# Patient Record
Sex: FEMALE | NOT HISPANIC OR LATINO | URBAN - METROPOLITAN AREA
[De-identification: names, ages, dates, MRNs, and addresses within clinical notes are randomized per-mention and may not be internally consistent; named-entity substitution may affect disease eponyms.]

---

## 2017-02-24 ENCOUNTER — FOLLOW UP (OUTPATIENT)
Dept: URBAN - METROPOLITAN AREA CLINIC 46 | Facility: CLINIC | Age: 67
End: 2017-02-24

## 2017-02-24 DIAGNOSIS — H35.81: ICD-10-CM

## 2017-02-24 DIAGNOSIS — H35.363: ICD-10-CM

## 2017-02-24 DIAGNOSIS — H26.492: ICD-10-CM

## 2017-02-24 DIAGNOSIS — H40.62X3: ICD-10-CM

## 2017-02-24 DIAGNOSIS — Z98.890: ICD-10-CM

## 2017-02-24 PROCEDURE — 92226 OPHTHALMOSCOPY (SUB): CPT

## 2017-02-24 PROCEDURE — 92134 CPTRZ OPH DX IMG PST SGM RTA: CPT

## 2017-02-24 PROCEDURE — 1036F TOBACCO NON-USER: CPT

## 2017-02-24 PROCEDURE — G8427 DOCREV CUR MEDS BY ELIG CLIN: HCPCS

## 2017-02-24 PROCEDURE — 92014 COMPRE OPH EXAM EST PT 1/>: CPT

## 2017-02-24 ASSESSMENT — TONOMETRY
OS_IOP_MMHG: 20
OD_IOP_MMHG: 15

## 2017-02-24 ASSESSMENT — VISUAL ACUITY
OS_SC: 20/25
OD_SC: 20/25
OS_CC: 20/25
OD_CC: 20/25

## 2017-07-14 ENCOUNTER — FOLLOW UP (OUTPATIENT)
Dept: URBAN - METROPOLITAN AREA CLINIC 46 | Facility: CLINIC | Age: 67
End: 2017-07-14

## 2017-07-14 DIAGNOSIS — Z98.890: ICD-10-CM

## 2017-07-14 DIAGNOSIS — H35.81: ICD-10-CM

## 2017-07-14 DIAGNOSIS — H35.363: ICD-10-CM

## 2017-07-14 DIAGNOSIS — H26.492: ICD-10-CM

## 2017-07-14 PROCEDURE — 1036F TOBACCO NON-USER: CPT

## 2017-07-14 PROCEDURE — 92134 CPTRZ OPH DX IMG PST SGM RTA: CPT

## 2017-07-14 PROCEDURE — G8482 FLU IMMUNIZE ORDER/ADMIN: HCPCS

## 2017-07-14 PROCEDURE — G8427 DOCREV CUR MEDS BY ELIG CLIN: HCPCS

## 2017-07-14 PROCEDURE — 92226 OPHTHALMOSCOPY (SUB): CPT

## 2017-07-14 PROCEDURE — 92014 COMPRE OPH EXAM EST PT 1/>: CPT

## 2017-07-14 ASSESSMENT — VISUAL ACUITY
OS_CC: 20/25
OS_SC: 20/25-1
OD_CC: 20/25-2
OD_SC: 20/20

## 2017-07-14 ASSESSMENT — TONOMETRY
OD_IOP_MMHG: 18
OS_IOP_MMHG: 18

## 2018-02-02 ENCOUNTER — FOLLOW UP (OUTPATIENT)
Dept: URBAN - METROPOLITAN AREA CLINIC 46 | Facility: CLINIC | Age: 68
End: 2018-02-02

## 2018-02-02 DIAGNOSIS — H35.363: ICD-10-CM

## 2018-02-02 DIAGNOSIS — H35.81: ICD-10-CM

## 2018-02-02 DIAGNOSIS — Z98.890: ICD-10-CM

## 2018-02-02 DIAGNOSIS — H26.492: ICD-10-CM

## 2018-02-02 PROCEDURE — 92134 CPTRZ OPH DX IMG PST SGM RTA: CPT

## 2018-02-02 PROCEDURE — 1036F TOBACCO NON-USER: CPT

## 2018-02-02 PROCEDURE — G8482 FLU IMMUNIZE ORDER/ADMIN: HCPCS

## 2018-02-02 PROCEDURE — 92014 COMPRE OPH EXAM EST PT 1/>: CPT

## 2018-02-02 PROCEDURE — G8427 DOCREV CUR MEDS BY ELIG CLIN: HCPCS

## 2018-02-02 PROCEDURE — 4040F PNEUMOC VAC/ADMIN/RCVD: CPT

## 2018-02-02 PROCEDURE — 92226 OPHTHALMOSCOPY (SUB): CPT

## 2018-02-02 ASSESSMENT — TONOMETRY
OD_IOP_MMHG: 18
OS_IOP_MMHG: 21

## 2018-02-02 ASSESSMENT — VISUAL ACUITY
OD_CC: 20/20-2
OS_CC: 20/30
OD_CC: 20/30-2
OS_CC: 20/20-2

## 2018-08-31 ENCOUNTER — FOLLOW UP (OUTPATIENT)
Dept: URBAN - METROPOLITAN AREA CLINIC 46 | Facility: CLINIC | Age: 68
End: 2018-08-31

## 2018-08-31 DIAGNOSIS — H35.363: ICD-10-CM

## 2018-08-31 DIAGNOSIS — H35.81: ICD-10-CM

## 2018-08-31 DIAGNOSIS — H26.492: ICD-10-CM

## 2018-08-31 DIAGNOSIS — Z98.890: ICD-10-CM

## 2018-08-31 PROCEDURE — 92226 OPHTHALMOSCOPY (SUB): CPT

## 2018-08-31 PROCEDURE — 92014 COMPRE OPH EXAM EST PT 1/>: CPT

## 2018-08-31 PROCEDURE — 4040F PNEUMOC VAC/ADMIN/RCVD: CPT

## 2018-08-31 PROCEDURE — 92134 CPTRZ OPH DX IMG PST SGM RTA: CPT

## 2018-08-31 PROCEDURE — G8482 FLU IMMUNIZE ORDER/ADMIN: HCPCS

## 2018-08-31 PROCEDURE — G9903 PT SCRN TBCO ID AS NON USER: HCPCS

## 2018-08-31 PROCEDURE — 1036F TOBACCO NON-USER: CPT

## 2018-08-31 PROCEDURE — G8427 DOCREV CUR MEDS BY ELIG CLIN: HCPCS

## 2018-08-31 ASSESSMENT — TONOMETRY
OD_IOP_MMHG: 14
OS_IOP_MMHG: 16

## 2018-08-31 ASSESSMENT — VISUAL ACUITY
OS_CC: 20/30-1
OS_SC: 20/25-2
OD_CC: 20/20-2
OD_SC: 20/25

## 2018-12-03 ENCOUNTER — FOLLOW UP (OUTPATIENT)
Dept: URBAN - METROPOLITAN AREA CLINIC 46 | Facility: CLINIC | Age: 68
End: 2018-12-03

## 2018-12-03 DIAGNOSIS — H01.02B: ICD-10-CM

## 2018-12-03 DIAGNOSIS — Z98.890: ICD-10-CM

## 2018-12-03 DIAGNOSIS — H35.81: ICD-10-CM

## 2018-12-03 DIAGNOSIS — H40.013: ICD-10-CM

## 2018-12-03 DIAGNOSIS — H35.363: ICD-10-CM

## 2018-12-03 DIAGNOSIS — H26.492: ICD-10-CM

## 2018-12-03 DIAGNOSIS — H01.02A: ICD-10-CM

## 2018-12-03 DIAGNOSIS — H04.123: ICD-10-CM

## 2018-12-03 DIAGNOSIS — H43.811: ICD-10-CM

## 2018-12-03 PROCEDURE — 92226 OPHTHALMOSCOPY (SUB): CPT

## 2018-12-03 PROCEDURE — 1036F TOBACCO NON-USER: CPT

## 2018-12-03 PROCEDURE — 99213 OFFICE O/P EST LOW 20 MIN: CPT

## 2018-12-03 PROCEDURE — G9903 PT SCRN TBCO ID AS NON USER: HCPCS

## 2018-12-03 PROCEDURE — 4040F PNEUMOC VAC/ADMIN/RCVD: CPT

## 2018-12-03 PROCEDURE — G8427 DOCREV CUR MEDS BY ELIG CLIN: HCPCS

## 2018-12-03 PROCEDURE — G8482 FLU IMMUNIZE ORDER/ADMIN: HCPCS

## 2018-12-03 ASSESSMENT — VISUAL ACUITY
OD_CC: 20/20-2
OS_CC: 20/20-2
OD_CC: 20/100
OS_CC: 20/100

## 2018-12-03 ASSESSMENT — TONOMETRY
OS_IOP_MMHG: 24
OD_IOP_MMHG: 18
OS_IOP_MMHG: 30

## 2019-11-15 ENCOUNTER — FOLLOW UP (OUTPATIENT)
Dept: URBAN - METROPOLITAN AREA CLINIC 46 | Facility: CLINIC | Age: 69
End: 2019-11-15

## 2019-11-15 DIAGNOSIS — H43.811: ICD-10-CM

## 2019-11-15 DIAGNOSIS — H01.02A: ICD-10-CM

## 2019-11-15 DIAGNOSIS — H35.363: ICD-10-CM

## 2019-11-15 DIAGNOSIS — H04.123: ICD-10-CM

## 2019-11-15 DIAGNOSIS — H26.492: ICD-10-CM

## 2019-11-15 DIAGNOSIS — H35.352: ICD-10-CM

## 2019-11-15 DIAGNOSIS — Z98.890: ICD-10-CM

## 2019-11-15 DIAGNOSIS — H01.02B: ICD-10-CM

## 2019-11-15 DIAGNOSIS — H25.11: ICD-10-CM

## 2019-11-15 DIAGNOSIS — H40.013: ICD-10-CM

## 2019-11-15 DIAGNOSIS — Z96.1: ICD-10-CM

## 2019-11-15 PROCEDURE — 92226 OPHTHALMOSCOPY (SUB): CPT

## 2019-11-15 PROCEDURE — 92134 CPTRZ OPH DX IMG PST SGM RTA: CPT

## 2019-11-15 PROCEDURE — 92014 COMPRE OPH EXAM EST PT 1/>: CPT

## 2019-11-15 ASSESSMENT — VISUAL ACUITY
OS_CC: 20/200
OD_CC: 20/25-1
OD_CC: 20/50
OS_CC: 20/40-1

## 2019-11-15 ASSESSMENT — TONOMETRY
OS_IOP_MMHG: 17
OD_IOP_MMHG: 14

## 2019-12-13 ENCOUNTER — FOLLOW UP (OUTPATIENT)
Dept: URBAN - METROPOLITAN AREA CLINIC 46 | Facility: CLINIC | Age: 69
End: 2019-12-13

## 2019-12-13 DIAGNOSIS — H40.013: ICD-10-CM

## 2019-12-13 DIAGNOSIS — H01.02B: ICD-10-CM

## 2019-12-13 DIAGNOSIS — H01.02A: ICD-10-CM

## 2019-12-13 DIAGNOSIS — Z98.890: ICD-10-CM

## 2019-12-13 DIAGNOSIS — H35.352: ICD-10-CM

## 2019-12-13 DIAGNOSIS — H26.492: ICD-10-CM

## 2019-12-13 DIAGNOSIS — H43.811: ICD-10-CM

## 2019-12-13 DIAGNOSIS — Z96.1: ICD-10-CM

## 2019-12-13 DIAGNOSIS — H25.11: ICD-10-CM

## 2019-12-13 DIAGNOSIS — H04.123: ICD-10-CM

## 2019-12-13 DIAGNOSIS — H35.363: ICD-10-CM

## 2019-12-13 PROCEDURE — 92014 COMPRE OPH EXAM EST PT 1/>: CPT

## 2019-12-13 PROCEDURE — 92226 OPHTHALMOSCOPY (SUB): CPT

## 2019-12-13 PROCEDURE — 92134 CPTRZ OPH DX IMG PST SGM RTA: CPT

## 2019-12-13 ASSESSMENT — VISUAL ACUITY
OS_CC: 20/30
OS_SC: 20/30+2
OD_SC: 20/50
OD_CC: 20/25+1

## 2019-12-13 ASSESSMENT — TONOMETRY
OS_IOP_MMHG: 13
OD_IOP_MMHG: 16

## 2020-01-10 ENCOUNTER — FOLLOW UP (OUTPATIENT)
Dept: URBAN - METROPOLITAN AREA CLINIC 46 | Facility: CLINIC | Age: 70
End: 2020-01-10

## 2020-01-10 DIAGNOSIS — H01.02B: ICD-10-CM

## 2020-01-10 DIAGNOSIS — H40.013: ICD-10-CM

## 2020-01-10 DIAGNOSIS — H35.372: ICD-10-CM

## 2020-01-10 DIAGNOSIS — Z98.890: ICD-10-CM

## 2020-01-10 DIAGNOSIS — H43.811: ICD-10-CM

## 2020-01-10 DIAGNOSIS — H35.363: ICD-10-CM

## 2020-01-10 DIAGNOSIS — H01.02A: ICD-10-CM

## 2020-01-10 DIAGNOSIS — H04.123: ICD-10-CM

## 2020-01-10 DIAGNOSIS — H35.352: ICD-10-CM

## 2020-01-10 DIAGNOSIS — H26.492: ICD-10-CM

## 2020-01-10 PROCEDURE — 92134 CPTRZ OPH DX IMG PST SGM RTA: CPT

## 2020-01-10 PROCEDURE — 92202 OPSCPY EXTND ON/MAC DRAW: CPT

## 2020-01-10 PROCEDURE — 92014 COMPRE OPH EXAM EST PT 1/>: CPT

## 2020-01-10 ASSESSMENT — VISUAL ACUITY
OD_SC: 20/30
OS_CC: 20/30
OS_SC: 20/30
OD_CC: 20/20-1

## 2020-01-10 ASSESSMENT — TONOMETRY
OS_IOP_MMHG: 10
OD_IOP_MMHG: 15

## 2020-02-21 ENCOUNTER — FOLLOW UP (OUTPATIENT)
Dept: URBAN - METROPOLITAN AREA CLINIC 46 | Facility: CLINIC | Age: 70
End: 2020-02-21

## 2020-02-21 DIAGNOSIS — H40.013: ICD-10-CM

## 2020-02-21 DIAGNOSIS — H26.492: ICD-10-CM

## 2020-02-21 DIAGNOSIS — H01.02B: ICD-10-CM

## 2020-02-21 DIAGNOSIS — H43.811: ICD-10-CM

## 2020-02-21 DIAGNOSIS — Z98.890: ICD-10-CM

## 2020-02-21 DIAGNOSIS — H35.372: ICD-10-CM

## 2020-02-21 DIAGNOSIS — H04.123: ICD-10-CM

## 2020-02-21 DIAGNOSIS — H01.02A: ICD-10-CM

## 2020-02-21 DIAGNOSIS — H35.352: ICD-10-CM

## 2020-02-21 DIAGNOSIS — H35.363: ICD-10-CM

## 2020-02-21 PROCEDURE — 92014 COMPRE OPH EXAM EST PT 1/>: CPT

## 2020-02-21 PROCEDURE — 92134 CPTRZ OPH DX IMG PST SGM RTA: CPT

## 2020-02-21 PROCEDURE — 92202 OPSCPY EXTND ON/MAC DRAW: CPT

## 2020-02-21 ASSESSMENT — TONOMETRY
OD_IOP_MMHG: 14
OS_IOP_MMHG: 14

## 2020-02-21 ASSESSMENT — VISUAL ACUITY
OD_SC: 20/40
OS_CC: 20/30
OS_SC: 20/30-1
OD_CC: 20/25

## 2020-06-05 ENCOUNTER — FOLLOW UP (OUTPATIENT)
Dept: URBAN - METROPOLITAN AREA CLINIC 46 | Facility: CLINIC | Age: 70
End: 2020-06-05

## 2020-06-05 DIAGNOSIS — H40.013: ICD-10-CM

## 2020-06-05 DIAGNOSIS — H35.363: ICD-10-CM

## 2020-06-05 DIAGNOSIS — H26.492: ICD-10-CM

## 2020-06-05 DIAGNOSIS — H43.811: ICD-10-CM

## 2020-06-05 DIAGNOSIS — H35.372: ICD-10-CM

## 2020-06-05 DIAGNOSIS — H04.123: ICD-10-CM

## 2020-06-05 DIAGNOSIS — H01.02A: ICD-10-CM

## 2020-06-05 DIAGNOSIS — H35.352: ICD-10-CM

## 2020-06-05 DIAGNOSIS — H01.02B: ICD-10-CM

## 2020-06-05 DIAGNOSIS — Z98.890: ICD-10-CM

## 2020-06-05 PROCEDURE — 92134 CPTRZ OPH DX IMG PST SGM RTA: CPT

## 2020-06-05 PROCEDURE — 92202 OPSCPY EXTND ON/MAC DRAW: CPT

## 2020-06-05 PROCEDURE — 92014 COMPRE OPH EXAM EST PT 1/>: CPT

## 2020-06-05 ASSESSMENT — VISUAL ACUITY
OD_CC: 20/40+
OS_CC: 20/50+

## 2020-06-05 ASSESSMENT — TONOMETRY
OD_IOP_MMHG: 18
OS_IOP_MMHG: 21

## 2020-06-30 ENCOUNTER — FOLLOW UP (OUTPATIENT)
Dept: URBAN - METROPOLITAN AREA CLINIC 87 | Facility: CLINIC | Age: 70
End: 2020-06-30

## 2020-06-30 VITALS — HEIGHT: 55 IN

## 2020-06-30 DIAGNOSIS — H35.352: ICD-10-CM

## 2020-06-30 DIAGNOSIS — H01.02B: ICD-10-CM

## 2020-06-30 DIAGNOSIS — H35.372: ICD-10-CM

## 2020-06-30 DIAGNOSIS — H04.123: ICD-10-CM

## 2020-06-30 DIAGNOSIS — H40.013: ICD-10-CM

## 2020-06-30 DIAGNOSIS — H26.492: ICD-10-CM

## 2020-06-30 DIAGNOSIS — H01.02A: ICD-10-CM

## 2020-06-30 DIAGNOSIS — Z98.890: ICD-10-CM

## 2020-06-30 DIAGNOSIS — H43.393: ICD-10-CM

## 2020-06-30 DIAGNOSIS — H43.811: ICD-10-CM

## 2020-06-30 DIAGNOSIS — H35.363: ICD-10-CM

## 2020-06-30 PROCEDURE — 92202 OPSCPY EXTND ON/MAC DRAW: CPT

## 2020-06-30 PROCEDURE — 92134 CPTRZ OPH DX IMG PST SGM RTA: CPT

## 2020-06-30 PROCEDURE — 92014 COMPRE OPH EXAM EST PT 1/>: CPT

## 2020-06-30 ASSESSMENT — TONOMETRY
OS_IOP_MMHG: 14
OD_IOP_MMHG: 15

## 2020-06-30 ASSESSMENT — VISUAL ACUITY
OS_CC: 20/40-1
OD_CC: 20/25

## 2020-07-31 ENCOUNTER — FOLLOW UP (OUTPATIENT)
Dept: URBAN - METROPOLITAN AREA CLINIC 46 | Facility: CLINIC | Age: 70
End: 2020-07-31

## 2020-07-31 VITALS — HEIGHT: 55 IN

## 2020-07-31 DIAGNOSIS — H35.372: ICD-10-CM

## 2020-07-31 DIAGNOSIS — H35.363: ICD-10-CM

## 2020-07-31 DIAGNOSIS — H40.013: ICD-10-CM

## 2020-07-31 DIAGNOSIS — H43.811: ICD-10-CM

## 2020-07-31 DIAGNOSIS — Z98.890: ICD-10-CM

## 2020-07-31 DIAGNOSIS — H01.02A: ICD-10-CM

## 2020-07-31 DIAGNOSIS — H01.02B: ICD-10-CM

## 2020-07-31 DIAGNOSIS — H04.123: ICD-10-CM

## 2020-07-31 DIAGNOSIS — H26.492: ICD-10-CM

## 2020-07-31 DIAGNOSIS — H35.352: ICD-10-CM

## 2020-07-31 PROCEDURE — 92134 CPTRZ OPH DX IMG PST SGM RTA: CPT

## 2020-07-31 PROCEDURE — 92202 OPSCPY EXTND ON/MAC DRAW: CPT

## 2020-07-31 PROCEDURE — 92014 COMPRE OPH EXAM EST PT 1/>: CPT

## 2020-07-31 ASSESSMENT — TONOMETRY
OD_IOP_MMHG: 15
OS_IOP_MMHG: 16

## 2020-07-31 ASSESSMENT — VISUAL ACUITY
OD_CC: 20/20-1
OS_CC: 20/30-1

## 2020-09-11 ENCOUNTER — FOLLOW UP (OUTPATIENT)
Dept: URBAN - METROPOLITAN AREA CLINIC 46 | Facility: CLINIC | Age: 70
End: 2020-09-11

## 2020-09-11 VITALS — HEIGHT: 55 IN

## 2020-09-11 DIAGNOSIS — H01.02B: ICD-10-CM

## 2020-09-11 DIAGNOSIS — H26.492: ICD-10-CM

## 2020-09-11 DIAGNOSIS — Z98.890: ICD-10-CM

## 2020-09-11 DIAGNOSIS — H01.02A: ICD-10-CM

## 2020-09-11 DIAGNOSIS — H43.811: ICD-10-CM

## 2020-09-11 DIAGNOSIS — H35.372: ICD-10-CM

## 2020-09-11 DIAGNOSIS — H04.123: ICD-10-CM

## 2020-09-11 DIAGNOSIS — H40.013: ICD-10-CM

## 2020-09-11 DIAGNOSIS — H35.352: ICD-10-CM

## 2020-09-11 DIAGNOSIS — H35.363: ICD-10-CM

## 2020-09-11 PROCEDURE — 92202 OPSCPY EXTND ON/MAC DRAW: CPT

## 2020-09-11 PROCEDURE — 92014 COMPRE OPH EXAM EST PT 1/>: CPT

## 2020-09-11 PROCEDURE — 92134 CPTRZ OPH DX IMG PST SGM RTA: CPT

## 2020-09-11 ASSESSMENT — TONOMETRY
OS_IOP_MMHG: 13
OD_IOP_MMHG: 14

## 2020-09-11 ASSESSMENT — VISUAL ACUITY
OS_CC: 20/30-2
OD_CC: 20/20

## 2020-10-23 ENCOUNTER — FOLLOW UP (OUTPATIENT)
Dept: URBAN - METROPOLITAN AREA CLINIC 46 | Facility: CLINIC | Age: 70
End: 2020-10-23

## 2020-10-23 DIAGNOSIS — H01.02B: ICD-10-CM

## 2020-10-23 DIAGNOSIS — H35.352: ICD-10-CM

## 2020-10-23 DIAGNOSIS — H04.123: ICD-10-CM

## 2020-10-23 DIAGNOSIS — H43.811: ICD-10-CM

## 2020-10-23 DIAGNOSIS — H01.02A: ICD-10-CM

## 2020-10-23 DIAGNOSIS — Z98.890: ICD-10-CM

## 2020-10-23 DIAGNOSIS — H40.013: ICD-10-CM

## 2020-10-23 DIAGNOSIS — H35.372: ICD-10-CM

## 2020-10-23 DIAGNOSIS — H35.363: ICD-10-CM

## 2020-10-23 DIAGNOSIS — H26.492: ICD-10-CM

## 2020-10-23 PROCEDURE — 92202 OPSCPY EXTND ON/MAC DRAW: CPT

## 2020-10-23 PROCEDURE — 92134 CPTRZ OPH DX IMG PST SGM RTA: CPT

## 2020-10-23 PROCEDURE — 92014 COMPRE OPH EXAM EST PT 1/>: CPT

## 2020-10-23 ASSESSMENT — TONOMETRY
OD_IOP_MMHG: 15
OS_IOP_MMHG: 14

## 2020-10-23 ASSESSMENT — VISUAL ACUITY
OS_CC: 20/30-2
OD_CC: 20/25-2

## 2020-12-11 ENCOUNTER — FOLLOW UP (OUTPATIENT)
Dept: URBAN - METROPOLITAN AREA CLINIC 46 | Facility: CLINIC | Age: 70
End: 2020-12-11

## 2020-12-11 VITALS — HEIGHT: 55 IN

## 2020-12-11 DIAGNOSIS — H35.352: ICD-10-CM

## 2020-12-11 DIAGNOSIS — H35.363: ICD-10-CM

## 2020-12-11 DIAGNOSIS — H40.013: ICD-10-CM

## 2020-12-11 DIAGNOSIS — H26.492: ICD-10-CM

## 2020-12-11 DIAGNOSIS — H01.02B: ICD-10-CM

## 2020-12-11 DIAGNOSIS — H35.372: ICD-10-CM

## 2020-12-11 DIAGNOSIS — H01.02A: ICD-10-CM

## 2020-12-11 DIAGNOSIS — H43.811: ICD-10-CM

## 2020-12-11 DIAGNOSIS — H04.123: ICD-10-CM

## 2020-12-11 DIAGNOSIS — Z98.890: ICD-10-CM

## 2020-12-11 PROCEDURE — 92202 OPSCPY EXTND ON/MAC DRAW: CPT

## 2020-12-11 PROCEDURE — 92014 COMPRE OPH EXAM EST PT 1/>: CPT

## 2020-12-11 PROCEDURE — 92134 CPTRZ OPH DX IMG PST SGM RTA: CPT

## 2020-12-11 ASSESSMENT — TONOMETRY
OS_IOP_MMHG: 14
OD_IOP_MMHG: 14

## 2020-12-11 ASSESSMENT — VISUAL ACUITY
OS_CC: 20/25
OD_CC: 20/25

## 2024-07-13 ENCOUNTER — INPATIENT (INPATIENT)
Facility: HOSPITAL | Age: 74
LOS: 3 days | Discharge: NURSING, REHAB OR LONG-TERM CARE | End: 2024-07-17
Attending: STUDENT IN AN ORGANIZED HEALTH CARE EDUCATION/TRAINING PROGRAM | Admitting: STUDENT IN AN ORGANIZED HEALTH CARE EDUCATION/TRAINING PROGRAM
Payer: MEDICARE

## 2024-07-13 VITALS
TEMPERATURE: 98 F | DIASTOLIC BLOOD PRESSURE: 86 MMHG | HEART RATE: 66 BPM | SYSTOLIC BLOOD PRESSURE: 160 MMHG | OXYGEN SATURATION: 98 % | HEIGHT: 60 IN | WEIGHT: 130.07 LBS | RESPIRATION RATE: 16 BRPM

## 2024-07-13 LAB
ALBUMIN SERPL ELPH-MCNC: 3.8 G/DL — SIGNIFICANT CHANGE UP (ref 3.3–5)
ALP SERPL-CCNC: 73 U/L — SIGNIFICANT CHANGE UP (ref 40–120)
ALT FLD-CCNC: 12 U/L — SIGNIFICANT CHANGE UP (ref 12–78)
ANION GAP SERPL CALC-SCNC: 5 MMOL/L — SIGNIFICANT CHANGE UP (ref 5–17)
APPEARANCE UR: CLEAR — SIGNIFICANT CHANGE UP
APTT BLD: 30.9 SEC — SIGNIFICANT CHANGE UP (ref 24.5–35.6)
AST SERPL-CCNC: 26 U/L — SIGNIFICANT CHANGE UP (ref 15–37)
BASOPHILS # BLD AUTO: 0.04 K/UL — SIGNIFICANT CHANGE UP (ref 0–0.2)
BASOPHILS NFR BLD AUTO: 0.7 % — SIGNIFICANT CHANGE UP (ref 0–2)
BILIRUB SERPL-MCNC: 0.6 MG/DL — SIGNIFICANT CHANGE UP (ref 0.2–1.2)
BILIRUB UR-MCNC: NEGATIVE — SIGNIFICANT CHANGE UP
BUN SERPL-MCNC: 12 MG/DL — SIGNIFICANT CHANGE UP (ref 7–23)
CALCIUM SERPL-MCNC: 9.1 MG/DL — SIGNIFICANT CHANGE UP (ref 8.5–10.1)
CHLORIDE SERPL-SCNC: 113 MMOL/L — HIGH (ref 96–108)
CO2 SERPL-SCNC: 29 MMOL/L — SIGNIFICANT CHANGE UP (ref 22–31)
COLOR SPEC: YELLOW — SIGNIFICANT CHANGE UP
CREAT SERPL-MCNC: 0.78 MG/DL — SIGNIFICANT CHANGE UP (ref 0.5–1.3)
DIFF PNL FLD: NEGATIVE — SIGNIFICANT CHANGE UP
EGFR: 79 ML/MIN/1.73M2 — SIGNIFICANT CHANGE UP
EOSINOPHIL # BLD AUTO: 0.14 K/UL — SIGNIFICANT CHANGE UP (ref 0–0.5)
EOSINOPHIL NFR BLD AUTO: 2.4 % — SIGNIFICANT CHANGE UP (ref 0–6)
GLUCOSE SERPL-MCNC: 100 MG/DL — HIGH (ref 70–99)
GLUCOSE UR QL: NEGATIVE MG/DL — SIGNIFICANT CHANGE UP
HCT VFR BLD CALC: 39.8 % — SIGNIFICANT CHANGE UP (ref 34.5–45)
HGB BLD-MCNC: 13.3 G/DL — SIGNIFICANT CHANGE UP (ref 11.5–15.5)
IMM GRANULOCYTES NFR BLD AUTO: 0.3 % — SIGNIFICANT CHANGE UP (ref 0–0.9)
INR BLD: 0.87 RATIO — SIGNIFICANT CHANGE UP (ref 0.85–1.18)
KETONES UR-MCNC: NEGATIVE MG/DL — SIGNIFICANT CHANGE UP
LEUKOCYTE ESTERASE UR-ACNC: NEGATIVE — SIGNIFICANT CHANGE UP
LYMPHOCYTES # BLD AUTO: 1.24 K/UL — SIGNIFICANT CHANGE UP (ref 1–3.3)
LYMPHOCYTES # BLD AUTO: 21.4 % — SIGNIFICANT CHANGE UP (ref 13–44)
MCHC RBC-ENTMCNC: 30.2 PG — SIGNIFICANT CHANGE UP (ref 27–34)
MCHC RBC-ENTMCNC: 33.4 G/DL — SIGNIFICANT CHANGE UP (ref 32–36)
MCV RBC AUTO: 90.5 FL — SIGNIFICANT CHANGE UP (ref 80–100)
MONOCYTES # BLD AUTO: 0.41 K/UL — SIGNIFICANT CHANGE UP (ref 0–0.9)
MONOCYTES NFR BLD AUTO: 7.1 % — SIGNIFICANT CHANGE UP (ref 2–14)
NEUTROPHILS # BLD AUTO: 3.94 K/UL — SIGNIFICANT CHANGE UP (ref 1.8–7.4)
NEUTROPHILS NFR BLD AUTO: 68.1 % — SIGNIFICANT CHANGE UP (ref 43–77)
NITRITE UR-MCNC: NEGATIVE — SIGNIFICANT CHANGE UP
NRBC # BLD: 0 /100 WBCS — SIGNIFICANT CHANGE UP (ref 0–0)
PH UR: 7.5 — SIGNIFICANT CHANGE UP (ref 5–8)
PLATELET # BLD AUTO: 212 K/UL — SIGNIFICANT CHANGE UP (ref 150–400)
POTASSIUM SERPL-MCNC: 3.5 MMOL/L — SIGNIFICANT CHANGE UP (ref 3.5–5.3)
POTASSIUM SERPL-SCNC: 3.5 MMOL/L — SIGNIFICANT CHANGE UP (ref 3.5–5.3)
PROT SERPL-MCNC: 7.1 GM/DL — SIGNIFICANT CHANGE UP (ref 6–8.3)
PROT UR-MCNC: NEGATIVE MG/DL — SIGNIFICANT CHANGE UP
PROTHROM AB SERPL-ACNC: 10.5 SEC — SIGNIFICANT CHANGE UP (ref 9.5–13)
RBC # BLD: 4.4 M/UL — SIGNIFICANT CHANGE UP (ref 3.8–5.2)
RBC # FLD: 13.9 % — SIGNIFICANT CHANGE UP (ref 10.3–14.5)
SODIUM SERPL-SCNC: 147 MMOL/L — HIGH (ref 135–145)
SP GR SPEC: 1.01 — SIGNIFICANT CHANGE UP (ref 1–1.03)
UROBILINOGEN FLD QL: 0.2 MG/DL — SIGNIFICANT CHANGE UP (ref 0.2–1)
WBC # BLD: 5.79 K/UL — SIGNIFICANT CHANGE UP (ref 3.8–10.5)
WBC # FLD AUTO: 5.79 K/UL — SIGNIFICANT CHANGE UP (ref 3.8–10.5)

## 2024-07-13 PROCEDURE — 72170 X-RAY EXAM OF PELVIS: CPT | Mod: 26,XE

## 2024-07-13 PROCEDURE — 74177 CT ABD & PELVIS W/CONTRAST: CPT | Mod: 26,MC

## 2024-07-13 PROCEDURE — 72125 CT NECK SPINE W/O DYE: CPT | Mod: 26,MC

## 2024-07-13 PROCEDURE — 73502 X-RAY EXAM HIP UNI 2-3 VIEWS: CPT | Mod: 26,RT

## 2024-07-13 PROCEDURE — 93010 ELECTROCARDIOGRAM REPORT: CPT

## 2024-07-13 PROCEDURE — 73562 X-RAY EXAM OF KNEE 3: CPT | Mod: 26,RT

## 2024-07-13 PROCEDURE — 99285 EMERGENCY DEPT VISIT HI MDM: CPT

## 2024-07-13 PROCEDURE — 73552 X-RAY EXAM OF FEMUR 2/>: CPT | Mod: 26,RT

## 2024-07-13 PROCEDURE — 70450 CT HEAD/BRAIN W/O DYE: CPT | Mod: 26,MC

## 2024-07-13 PROCEDURE — 99222 1ST HOSP IP/OBS MODERATE 55: CPT

## 2024-07-13 PROCEDURE — 71260 CT THORAX DX C+: CPT | Mod: 26,MC

## 2024-07-13 RX ORDER — PIMAVANSERIN TARTRATE 10 MG/1
34 TABLET, COATED ORAL
Refills: 0 | Status: DISCONTINUED | OUTPATIENT
Start: 2024-07-13 | End: 2024-07-15

## 2024-07-13 RX ORDER — MEMANTINE HYDROCHLORIDE 7 MG/1
10 CAPSULE, EXTENDED RELEASE ORAL DAILY
Refills: 0 | Status: DISCONTINUED | OUTPATIENT
Start: 2024-07-13 | End: 2024-07-17

## 2024-07-13 RX ORDER — CLONAZEPAM 2 MG/1
0.25 TABLET ORAL AT BEDTIME
Refills: 0 | Status: DISCONTINUED | OUTPATIENT
Start: 2024-07-13 | End: 2024-07-17

## 2024-07-13 RX ORDER — MAGNESIUM, ALUMINUM HYDROXIDE 400-400
30 TABLET,CHEWABLE ORAL EVERY 4 HOURS
Refills: 0 | Status: DISCONTINUED | OUTPATIENT
Start: 2024-07-13 | End: 2024-07-17

## 2024-07-13 RX ORDER — CARBIDOPA AND LEVODOPA 10; 100 MG/1; MG/1
1 TABLET ORAL
Refills: 0 | Status: DISCONTINUED | OUTPATIENT
Start: 2024-07-13 | End: 2024-07-17

## 2024-07-13 RX ORDER — CLONAZEPAM 2 MG/1
0.25 TABLET ORAL AT BEDTIME
Refills: 0 | Status: DISCONTINUED | OUTPATIENT
Start: 2024-07-13 | End: 2024-07-13

## 2024-07-13 RX ORDER — ACETAMINOPHEN 325 MG
1000 TABLET ORAL ONCE
Refills: 0 | Status: COMPLETED | OUTPATIENT
Start: 2024-07-13 | End: 2024-07-13

## 2024-07-13 RX ORDER — ATORVASTATIN CALCIUM 20 MG/1
40 TABLET, FILM COATED ORAL AT BEDTIME
Refills: 0 | Status: DISCONTINUED | OUTPATIENT
Start: 2024-07-13 | End: 2024-07-17

## 2024-07-13 RX ORDER — ASPIRIN 325 MG/1
81 TABLET, FILM COATED ORAL DAILY
Refills: 0 | Status: DISCONTINUED | OUTPATIENT
Start: 2024-07-13 | End: 2024-07-17

## 2024-07-13 RX ORDER — OXYCODONE AND ACETAMINOPHEN 5; 325 MG/1; MG/1
2 TABLET ORAL EVERY 6 HOURS
Refills: 0 | Status: DISCONTINUED | OUTPATIENT
Start: 2024-07-13 | End: 2024-07-17

## 2024-07-13 RX ORDER — CLOPIDOGREL BISULFATE 75 MG/1
75 TABLET, FILM COATED ORAL DAILY
Refills: 0 | Status: DISCONTINUED | OUTPATIENT
Start: 2024-07-13 | End: 2024-07-17

## 2024-07-13 RX ORDER — ACETAMINOPHEN 325 MG
650 TABLET ORAL EVERY 6 HOURS
Refills: 0 | Status: DISCONTINUED | OUTPATIENT
Start: 2024-07-13 | End: 2024-07-17

## 2024-07-13 RX ORDER — ONDANSETRON HYDROCHLORIDE 2 MG/ML
4 INJECTION INTRAMUSCULAR; INTRAVENOUS EVERY 8 HOURS
Refills: 0 | Status: DISCONTINUED | OUTPATIENT
Start: 2024-07-13 | End: 2024-07-17

## 2024-07-13 RX ORDER — AMLODIPINE BESYLATE 2.5 MG/1
2.5 TABLET ORAL DAILY
Refills: 0 | Status: DISCONTINUED | OUTPATIENT
Start: 2024-07-13 | End: 2024-07-17

## 2024-07-13 RX ADMIN — ATORVASTATIN CALCIUM 40 MILLIGRAM(S): 20 TABLET, FILM COATED ORAL at 21:35

## 2024-07-13 RX ADMIN — Medication 1000 MILLIGRAM(S): at 20:57

## 2024-07-13 RX ADMIN — Medication 400 MILLIGRAM(S): at 20:16

## 2024-07-13 RX ADMIN — CLONAZEPAM 0.25 MILLIGRAM(S): 2 TABLET ORAL at 21:36

## 2024-07-13 RX ADMIN — CARBIDOPA AND LEVODOPA 1 TABLET(S): 10; 100 TABLET ORAL at 21:35

## 2024-07-13 NOTE — H&P ADULT - NSHPPHYSICALEXAM_GEN_ALL_CORE
GENERAL: NAD  HEAD:  Atraumatic   EYES: EOMI, slight eyelid droop of L eye (from previous stroke)   ENMT: Moist mucous membranes  NECK: Supple   NERVOUS SYSTEM:  Awake  CHEST/LUNG: CTAB   HEART: RRR   ABDOMEN: Soft, Nontender, Nondistended  EXTREMITIES:   No clubbing, cyanosis, or edema  PSYCH: Mood appropriate

## 2024-07-13 NOTE — PATIENT PROFILE ADULT - FALL HARM RISK - HARM RISK INTERVENTIONS
Assistance with ambulation/Assistance OOB with selected safe patient handling equipment/Communicate Risk of Fall with Harm to all staff/Discuss with provider need for PT consult/Monitor for mental status changes/Monitor gait and stability/Move patient closer to nurses' station/Provide patient with walking aids - walker, cane, crutches/Reinforce activity limits and safety measures with patient and family/Reorient to person, place and time as needed/Tailored Fall Risk Interventions/Toileting schedule using arm’s reach rule for commode and bathroom/Use of alarms - bed, chair and/or voice tab/Visual Cue: Yellow wristband and red socks/Bed in lowest position, wheels locked, appropriate side rails in place/Call bell, personal items and telephone in reach/Instruct patient to call for assistance before getting out of bed or chair/Non-slip footwear when patient is out of bed/[place] to call system/Physically safe environment - no spills, clutter or unnecessary equipment/Purposeful Proactive Rounding/Room/bathroom lighting operational, light cord in reach

## 2024-07-13 NOTE — ED ADULT NURSE REASSESSMENT NOTE - NS ED NURSE REASSESS COMMENT FT1
Pt awake and alert. Sitting up in bed, talking with family member at bedside. Awaiting results. Continuos cardiac monitoring in progress.

## 2024-07-13 NOTE — ED PROVIDER NOTE - MUSCULOSKELETAL, MLM
Spine appears normal and there is no c/t/l spinal tenderness. Pelvis stable. R lateral hip is tender and pain is elicted on R hip movement.

## 2024-07-13 NOTE — CONSULT NOTE ADULT - SUBJECTIVE AND OBJECTIVE BOX
Patient is a 73y Female who presents c/o LBP, R hip pain sp MF earlier today.  Patient was going to use the bathroom when she lost her balance and hit the wall w her R side and slid down.  She was able to walk after. She came to the ED for evaluation. Denies HS/LOC. Denies pain/injury elsewhere. Denies numbness/tingling/paresthesias/weakness. Denies bowel/bladder incontinence. Denies fevers/chills. No other complaints at this time.    HEALTH ISSUES - PROBLEM Dx:          MEDICATIONS  (STANDING):      Allergies    No Known Allergies    Intolerances        PAST MEDICAL & SURGICAL HISTORY:  History of Parkinson's disease    High blood pressure    High cholesterol    Stroke    Osteoporosis                              13.3   5.79  )-----------( 212      ( 2024 08:35 )             39.8       2024 08:35    147    |  113    |  12     ----------------------------<  100    3.5     |  29     |  0.78     Ca    9.1        2024 08:35    TPro  7.1    /  Alb  3.8    /  TBili  0.6    /  DBili  x      /  AST  26     /  ALT  12     /  AlkPhos  73     2024 08:35      PT/INR - ( 2024 08:35 )   PT: 10.5 sec;   INR: 0.87 ratio         PTT - ( 2024 08:35 )  PTT:30.9 sec    Urinalysis Basic - ( 2024 08:35 )    Color: Yellow / Appearance: Clear / S.006 / pH: x  Gluc: 100 mg/dL / Ketone: Negative mg/dL  / Bili: Negative / Urobili: 0.2 mg/dL   Blood: x / Protein: Negative mg/dL / Nitrite: Negative   Leuk Esterase: Negative / RBC: x / WBC x   Sq Epi: x / Non Sq Epi: x / Bacteria: x        Vital Signs Last 24 Hrs  T(C): 36.4 (24 @ 11:07), Max: 36.6 (24 @ 07:46)  T(F): 97.5 (24 @ 11:07), Max: 97.8 (24 @ 07:46)  HR: 61 (24 @ 11:07) (61 - 66)  BP: 137/100 (24 @ 11:07) (137/100 - 160/86)  BP(mean): --  RR: 18 (24 @ 11:07) (16 - 18)  SpO2: 96% (24 @ 11:07) (96% - 98%)    Physical Exam:  Gen: NAD  Spine:  Skin intact  No gross deformity  No midline TTP C/T/L/S spine  No bony step offs  No paraspinal muscle ttp/hypertonicity   Negative Straight leg raise  Negative clonus  Negative babinski  Negative knox  No saddle anesthesia  No pain w axial loading   No pain w log rolling     Motor:                   C5                C6              C7               C8           T1   R            5/5                5/5            5/5             5/5          5/5  L             5/5               5/5             5/5             5/5          5/5                L2             L3             L4               L5            S1  R         5/5           5/5          5/5             5/5           5/5  L          5/5          5/5           5/5             5/5           5/5    Sensory:            C5         C6         C7      C8       T1        (0=absent, 1=impaired, 2=normal, NT=not testable)  R         2            2           2        2         2  L          2            2           2        2         2               L2          L3         L4      L5       S1         (0=absent, 1=impaired, 2=normal, NT=not testable)  R         2            2            2        2        2  L          2            2           2        2         2    Imaging:     < from: CT Chest w/ IV Cont (24 @ 11:44) >    IMPRESSION:  Acute mild L1 compression fracture.    < end of copied text >      A/P: 73y Female with L1 VCF  Pain control  WBAT with assistive devices as needed  FU Labs/imaging  SCDs  FU as outpt w Dr Borja as needed  Will DW Dr Borja and update as needed  stable for ortho DC Patient is a 73y Female who presents c/o LBP, R hip pain sp MF earlier today.  Patient was going to use the bathroom when she lost her balance and hit the wall w her R side and slid down.  She was able to walk after. She came to the ED for evaluation. Denies HS/LOC. Denies pain/injury elsewhere. Denies numbness/tingling/paresthesias/weakness. Denies bowel/bladder incontinence. Denies fevers/chills. No other complaints at this time.    HEALTH ISSUES - PROBLEM Dx:          MEDICATIONS  (STANDING):      Allergies    No Known Allergies    Intolerances        PAST MEDICAL & SURGICAL HISTORY:  History of Parkinson's disease    High blood pressure    High cholesterol    Stroke    Osteoporosis                              13.3   5.79  )-----------( 212      ( 2024 08:35 )             39.8       2024 08:35    147    |  113    |  12     ----------------------------<  100    3.5     |  29     |  0.78     Ca    9.1        2024 08:35    TPro  7.1    /  Alb  3.8    /  TBili  0.6    /  DBili  x      /  AST  26     /  ALT  12     /  AlkPhos  73     2024 08:35      PT/INR - ( 2024 08:35 )   PT: 10.5 sec;   INR: 0.87 ratio         PTT - ( 2024 08:35 )  PTT:30.9 sec    Urinalysis Basic - ( 2024 08:35 )    Color: Yellow / Appearance: Clear / S.006 / pH: x  Gluc: 100 mg/dL / Ketone: Negative mg/dL  / Bili: Negative / Urobili: 0.2 mg/dL   Blood: x / Protein: Negative mg/dL / Nitrite: Negative   Leuk Esterase: Negative / RBC: x / WBC x   Sq Epi: x / Non Sq Epi: x / Bacteria: x        Vital Signs Last 24 Hrs  T(C): 36.4 (24 @ 11:07), Max: 36.6 (24 @ 07:46)  T(F): 97.5 (24 @ 11:07), Max: 97.8 (24 @ 07:46)  HR: 61 (24 @ 11:07) (61 - 66)  BP: 137/100 (24 @ 11:07) (137/100 - 160/86)  BP(mean): --  RR: 18 (24 @ 11:07) (16 - 18)  SpO2: 96% (24 @ 11:07) (96% - 98%)    Physical Exam:  Gen: NAD  Spine:  Skin intact  No gross deformity  No midline TTP C/T/L/S spine  No bony step offs  No paraspinal muscle ttp/hypertonicity   Negative Straight leg raise  Negative clonus  Negative babinski  Negative knox  No saddle anesthesia  No pain w axial loading   No pain w log rolling     Motor:                   C5                C6              C7               C8           T1   R            5/5                5/5            5/5             5/5          5/5  L             5/5               5/5             5/5             5/5          5/5                L2             L3             L4               L5            S1  R         5/5           5/5          5/5             5/5           5/5  L          5/5          5/5           5/5             5/5           5/5    Sensory:            C5         C6         C7      C8       T1        (0=absent, 1=impaired, 2=normal, NT=not testable)  R         2            2           2        2         2  L          2            2           2        2         2               L2          L3         L4      L5       S1         (0=absent, 1=impaired, 2=normal, NT=not testable)  R         2            2            2        2        2  L          2            2           2        2         2    Imaging:     < from: CT Chest w/ IV Cont (24 @ 11:44) >    IMPRESSION:  Acute mild L1 compression fracture.    < end of copied text >      A/P: 73y Female with L1 VCF  Pain control  WBAT with assistive devices as needed  FU Labs/imaging  SCDs  FU as outpt w Dr Alcaraz as needed  Will DW Dr Borja and update as needed  stable for ortho DC

## 2024-07-13 NOTE — ED ADULT NURSE NOTE - NSFALLHARMRISKINTERV_ED_ALL_ED
Assistance OOB with selected safe patient handling equipment if applicable/Assistance with ambulation/Communicate risk of Fall with Harm to all staff, patient, and family/Monitor gait and stability/Provide visual cue: red socks, yellow wristband, yellow gown, etc/Reinforce activity limits and safety measures with patient and family/Bed in lowest position, wheels locked, appropriate side rails in place/Call bell, personal items and telephone in reach/Instruct patient to call for assistance before getting out of bed/chair/stretcher/Non-slip footwear applied when patient is off stretcher/[place] to call system/Physically safe environment - no spills, clutter or unnecessary equipment/Purposeful Proactive Rounding/Room/bathroom lighting operational, light cord in reach

## 2024-07-13 NOTE — ED PROVIDER NOTE - PROGRESS NOTE DETAILS
appreciate ortho consult who states pt doesn't need surgery. however the patient and her sister are concerned its unsafe for her to be home in this condition bc of the stairs at home and the patient is still having trouble walking due to pain so will adm for pt mily, pain control - Elmer PINON

## 2024-07-13 NOTE — H&P ADULT - HISTORY OF PRESENT ILLNESS
This is a 74 yo F PMH Parkinson's disease, CVA, osteoporosis, HTN, HLD presenting after fall. States she was walking back from the bathroom and lost balance. She is accompanied by her sister at bedside. She denies hitting her head or any loss of consciousness. She hit her hip and back when she fell. She is supposed to use a walker but does not. Any movement exacerbates the pain, while staying still alleviates it. She is unable to ambulate independently due to pain. There are no other associated symptoms. Denies numbness, tingling in lower extremities. Denies fever, chills, nausea, vomiting, lightheadedness, dizziness, changes in vision or hearing.     In ED, VSS. Labs revealed mildly elevated Na at 147. UA negative. CT chest w/ IV cont revealed L1 compression fracture. Ortho evaluated the patient, no acute intervention. Admit for PT.

## 2024-07-13 NOTE — PATIENT PROFILE ADULT - FALL HARM RISK - FACTORS
Poor balance/Other Tumor Depth: Less than 6mm from granular layer and no invasion beyond the subcutaneous fat

## 2024-07-13 NOTE — PATIENT PROFILE ADULT - FUNCTIONAL ASSESSMENT - BASIC MOBILITY 6.
3-calculated by average/Not able to assess (calculate score using [facility] averaging method)

## 2024-07-13 NOTE — ED ADULT NURSE NOTE - OBJECTIVE STATEMENT
73 yr old female with PMH of Parkinson's disease, early onset dementia, hypertension. Pt comes in after falling this morning from losing balance. Pt sister was in the room with her but unsure if pt hit head. Pt states falling on a tile matt. Pt denies chest pain, dizziness.

## 2024-07-13 NOTE — ED PROVIDER NOTE - OBJECTIVE STATEMENT
At about 5am today the patient was walking to the bathroom to urinate when she lost her balance and fell striking the wall with her right shoulder and hip then she went to the ground. No loss of consciousness. Her sister helped her to the bathroom where she urinated and gave the patient 500mg of acetaminophen, which helped with the pain, then EMS was called. The patient reports EMS placed an IV and gave IV pain medicine.

## 2024-07-13 NOTE — ED PROVIDER NOTE - CARDIAC, MLM
Normal rate, regular rhythm.  Heart sounds S1, S2.  No murmurs, rubs or gallops. DP pulses 2+ and equal bilaterally.

## 2024-07-13 NOTE — PATIENT PROFILE ADULT - HISTORY OF COVID-19 VACCINATION
[FreeTextEntry1] : 65-year-old man returns for rheumatology evaluation.  Patient previously with history of PMR in 2018, treated with prednisone at that time for about 8 to 9 months, completed prednisone in 2019.  Patient reports onset of polyarthralgias about 2 to 3 months ago, worse in the morning, not associated with swelling of the joints, most predominant in the proximal distribution of the shoulders and hips, although also including the hands and wrist bilaterally.  Patient reports symptoms similar to previous history of PMR although not as severe, however with associated fatigue at this time as well.  At this time, possible recurrence of PMR versus an inflammatory polyarthritis given involvement of the hands and wrists, versus early myelopathic symptoms.  Will update labs today in office including CBC, CMP, ESR, CRP, CHIQUIS with reflex to serologies, rheumatoid factor, CCP, CPK, aldolase, as well as Lyme serologies.  Probable initiation of prednisone following lab results.  Further management pending results.  
Yes

## 2024-07-13 NOTE — H&P ADULT - ASSESSMENT
This is a 72 yo F PMH Parkinson's disease, CVA, osteoporosis, HTN, HLD presenting after fall.    Fall       Parkinson's Disease     H/o CVA     HTN    HLD     Osteoporosis    DVT ppx    This is a 74 yo F PMH Parkinson's disease, CVA, osteoporosis, HTN, HLD presenting after fall.    Fall   L1 compression fracture   CT chest, abdomen, pelvis revealing L1 compression fracture   XRays as above  Evaluated by ortho- no acute intervention  WBAT  Pain control   Unable to ambulate d/t pain -- PT consult     Parkinson's Disease   C/w home carbidopa/levadopa 25/100mg TID and Carbidopa/levadopa ER 25/100mg QHS     H/o CVA   C/w aspirin and statin     HTN  C/w amlodipine 2.5mg qd  DASH diet     HLD   c/w statin     Thyroid enlargement  Thyroid nodules   Will check TSH level   Recommend nonemergent thyroid function testing and ultrasound evaluation outpatient    Osteoporosis  Used to be on monthly therapy, no longer taking   Would likely benefit from bisphosphonate therapy given compression fracture   F/u pcp    DVT ppx - scds

## 2024-07-13 NOTE — ED ADULT TRIAGE NOTE - CHIEF COMPLAINT QUOTE
s/p fall in the house c/o pain to the lower back and side, patient is on Plavix . h/o parkinson's and high blood pressure. med lock 20 gauge left ac and Toradol 30mg IV given

## 2024-07-13 NOTE — ED ADULT NURSE NOTE - CAS TRG GENERAL AIRWAY, MLM
Patient awake and alert, rr unlabored, skin warm and dry. VSS. Discharge instructions provided and explained to parent. Tylenol/motrin usage explained - went over frequency and dosing. Parent verbalized understanding of all instructions. Parent aware to follow up with PCP as instructed.  Aware to come back to ED if any worsening symptoms. All questions were answered. Patient discharged home in stable condition. Instructed how to obtain results via [employer] drew if they choose.   
Patent

## 2024-07-13 NOTE — H&P ADULT - NSHPLABSRESULTS_GEN_ALL_CORE
13.3   5.79  )-----------( 212      ( 2024 08:35 )             39.8     07-13    147<H>  |  113<H>  |  12  ----------------------------<  100<H>  3.5   |  29  |  0.78    Ca    9.1      2024 08:35    TPro  7.1  /  Alb  3.8  /  TBili  0.6  /  DBili  x   /  AST  26  /  ALT  12  /  AlkPhos  73  07-13    PT/INR - ( 2024 08:35 )   PT: 10.5 sec;   INR: 0.87 ratio         PTT - ( 2024 08:35 )  PTT:30.9 sec  Urinalysis Basic - ( 2024 08:35 )    Color: Yellow / Appearance: Clear / S.006 / pH: x  Gluc: 100 mg/dL / Ketone: Negative mg/dL  / Bili: Negative / Urobili: 0.2 mg/dL   Blood: x / Protein: Negative mg/dL / Nitrite: Negative   Leuk Esterase: Negative / RBC: x / WBC x   Sq Epi: x / Non Sq Epi: x / Bacteria: x      < from: CT Abdomen and Pelvis w/ IV Cont (24 @ 11:45) >    PROCEDURE:  CT of the Chest, Abdomen and Pelvis was performed.  Sagittal and coronal reformats were performed.    FINDINGS:  CHEST:  LUNGS AND LARGE AIRWAYS: Patent central airways. No pulmonary nodules.   Scattered areas of minor linear scarring. No pulmonic contusion. No   airspace consolidation.  PLEURA: No pleural effusion.  VESSELS: Within normal limits.  HEART: Cardiomegaly. No pericardial effusion.  MEDIASTINUM AND JOSEPH: No lymphadenopathy.  CHEST WALL AND LOWER NECK: Nodular enlargement of the thyroid gland.   Scattered 5 mm and less low attenuation thyroid nodules.    ABDOMEN AND PELVIS:  LIVER: 1.1cm cyst or hemangioma inferior aspect right hepatic lobe. No   perihepatic fluid/capsular contusion.  BILE DUCTS: Normal caliber.  GALLBLADDER: Within normal limits.  SPLEEN: Within normal limits.  PANCREAS: Within normal limits.  ADRENALS: Within normal limits.  KIDNEYS/URETERS: Symmetric renal enhancement. No collecting system   obstruction bilaterally.    BLADDER: Within normal limits.  REPRODUCTIVE ORGANS: Within normal limits for patient of this age    BOWEL: No bowel obstruction. Appendix is normal.  PERITONEUM/RETROPERITONEUM: Within normal limits.  VESSELS: Atherosclerotic calcifications  LYMPH NODES: No lymphadenopathy.  ABDOMINAL WALL: Within normal limits.  BONES: No acute osseous injuries. Degenerative changes    IMPRESSION:  No acute findings in the chest, abdomen and pelvis.    < end of copied text >    < from: Xray Hip 2-3 Views, Right (24 @ 09:48) >      INTERPRETATION:  Right hip, pelvis, right femur, and right knee. Patient   had a fall.    IMPRESSION: Right knee replacement. No acute finding.    < end of copied text >  < from: CT Head No Cont (24 @ 11:42) >    IMPRESSION:  CT head:  -No acute intracranial findings.    CT cervical spine:  -No acute fracture or dislocation.  -Mildly enlarged heterogenous thyroid. Consider nonemergent thyroid   function testing and ultrasound evaluation.    < end of copied text >

## 2024-07-13 NOTE — ED PROVIDER NOTE - NSICDXPASTMEDICALHX_GEN_ALL_CORE_FT
PAST MEDICAL HISTORY:  High blood pressure     High cholesterol     History of Parkinson's disease     Osteoporosis     Stroke

## 2024-07-14 LAB
ANION GAP SERPL CALC-SCNC: 8 MMOL/L — SIGNIFICANT CHANGE UP (ref 5–17)
BUN SERPL-MCNC: 18 MG/DL — SIGNIFICANT CHANGE UP (ref 7–23)
CALCIUM SERPL-MCNC: 9.3 MG/DL — SIGNIFICANT CHANGE UP (ref 8.5–10.1)
CHLORIDE SERPL-SCNC: 105 MMOL/L — SIGNIFICANT CHANGE UP (ref 96–108)
CO2 SERPL-SCNC: 26 MMOL/L — SIGNIFICANT CHANGE UP (ref 22–31)
CREAT SERPL-MCNC: 0.68 MG/DL — SIGNIFICANT CHANGE UP (ref 0.5–1.3)
EGFR: 91 ML/MIN/1.73M2 — SIGNIFICANT CHANGE UP
GLUCOSE SERPL-MCNC: 86 MG/DL — SIGNIFICANT CHANGE UP (ref 70–99)
HCT VFR BLD CALC: 41.3 % — SIGNIFICANT CHANGE UP (ref 34.5–45)
HGB BLD-MCNC: 13.7 G/DL — SIGNIFICANT CHANGE UP (ref 11.5–15.5)
MCHC RBC-ENTMCNC: 29.8 PG — SIGNIFICANT CHANGE UP (ref 27–34)
MCHC RBC-ENTMCNC: 33.2 G/DL — SIGNIFICANT CHANGE UP (ref 32–36)
MCV RBC AUTO: 90 FL — SIGNIFICANT CHANGE UP (ref 80–100)
NRBC # BLD: 0 /100 WBCS — SIGNIFICANT CHANGE UP (ref 0–0)
PLATELET # BLD AUTO: 203 K/UL — SIGNIFICANT CHANGE UP (ref 150–400)
POTASSIUM SERPL-MCNC: 3.5 MMOL/L — SIGNIFICANT CHANGE UP (ref 3.5–5.3)
POTASSIUM SERPL-SCNC: 3.5 MMOL/L — SIGNIFICANT CHANGE UP (ref 3.5–5.3)
RBC # BLD: 4.59 M/UL — SIGNIFICANT CHANGE UP (ref 3.8–5.2)
RBC # FLD: 13.7 % — SIGNIFICANT CHANGE UP (ref 10.3–14.5)
SODIUM SERPL-SCNC: 139 MMOL/L — SIGNIFICANT CHANGE UP (ref 135–145)
TSH SERPL-MCNC: 1.06 UIU/ML — SIGNIFICANT CHANGE UP (ref 0.36–3.74)
WBC # BLD: 4.3 K/UL — SIGNIFICANT CHANGE UP (ref 3.8–10.5)
WBC # FLD AUTO: 4.3 K/UL — SIGNIFICANT CHANGE UP (ref 3.8–10.5)

## 2024-07-14 PROCEDURE — 99232 SBSQ HOSP IP/OBS MODERATE 35: CPT

## 2024-07-14 PROCEDURE — 93010 ELECTROCARDIOGRAM REPORT: CPT

## 2024-07-14 RX ADMIN — MEMANTINE HYDROCHLORIDE 10 MILLIGRAM(S): 7 CAPSULE, EXTENDED RELEASE ORAL at 11:38

## 2024-07-14 RX ADMIN — AMLODIPINE BESYLATE 2.5 MILLIGRAM(S): 2.5 TABLET ORAL at 05:17

## 2024-07-14 RX ADMIN — Medication 650 MILLIGRAM(S): at 16:30

## 2024-07-14 RX ADMIN — Medication 650 MILLIGRAM(S): at 06:18

## 2024-07-14 RX ADMIN — Medication 650 MILLIGRAM(S): at 15:58

## 2024-07-14 RX ADMIN — CARBIDOPA AND LEVODOPA 1 TABLET(S): 10; 100 TABLET ORAL at 16:17

## 2024-07-14 RX ADMIN — CLONAZEPAM 0.25 MILLIGRAM(S): 2 TABLET ORAL at 21:28

## 2024-07-14 RX ADMIN — CARBIDOPA AND LEVODOPA 1 TABLET(S): 10; 100 TABLET ORAL at 09:18

## 2024-07-14 RX ADMIN — ATORVASTATIN CALCIUM 40 MILLIGRAM(S): 20 TABLET, FILM COATED ORAL at 21:28

## 2024-07-14 RX ADMIN — Medication 650 MILLIGRAM(S): at 05:18

## 2024-07-14 RX ADMIN — CARBIDOPA AND LEVODOPA 1 TABLET(S): 10; 100 TABLET ORAL at 11:38

## 2024-07-14 RX ADMIN — CLOPIDOGREL BISULFATE 75 MILLIGRAM(S): 75 TABLET, FILM COATED ORAL at 11:38

## 2024-07-14 RX ADMIN — CARBIDOPA AND LEVODOPA 1 TABLET(S): 10; 100 TABLET ORAL at 19:53

## 2024-07-14 RX ADMIN — ASPIRIN 81 MILLIGRAM(S): 325 TABLET, FILM COATED ORAL at 11:37

## 2024-07-14 NOTE — PHYSICAL THERAPY INITIAL EVALUATION ADULT - ADDITIONAL COMMENTS
as per patient and verified by sister, pt lives in a rental apartment with 4 stairs with R HR to access to main level. they do not have stairs inside the house, pt was independent with no AD.

## 2024-07-14 NOTE — PHYSICAL THERAPY INITIAL EVALUATION ADULT - GAIT TRAINING, PT EVAL
To be able to perform ambulation independently using rolling walker for 200 feet using proper technique using AD, with proper posture and functional distance at home in 2 weeks.

## 2024-07-14 NOTE — PHYSICAL THERAPY INITIAL EVALUATION ADULT - PERTINENT HX OF CURRENT PROBLEM, REHAB EVAL
This is the hx of ANABELA a 72 y/o female patient who was admitted to [facility] due to complications of Compression Fracture L1 affecting medical condition and with subsequent affection on functional mobility. CT chest 7/13/24: Acute mild L1 compression Fx.

## 2024-07-15 PROCEDURE — 99239 HOSP IP/OBS DSCHRG MGMT >30: CPT

## 2024-07-15 RX ORDER — PIMAVANSERIN TARTRATE 10 MG/1
1 TABLET, COATED ORAL
Qty: 0 | Refills: 0 | DISCHARGE
Start: 2024-07-15

## 2024-07-15 RX ORDER — AMLODIPINE BESYLATE 2.5 MG/1
1 TABLET ORAL
Qty: 0 | Refills: 0 | DISCHARGE
Start: 2024-07-15

## 2024-07-15 RX ORDER — ATORVASTATIN CALCIUM 20 MG/1
1 TABLET, FILM COATED ORAL
Qty: 0 | Refills: 0 | DISCHARGE
Start: 2024-07-15

## 2024-07-15 RX ORDER — MEMANTINE HYDROCHLORIDE 7 MG/1
1 CAPSULE, EXTENDED RELEASE ORAL
Qty: 0 | Refills: 0 | DISCHARGE
Start: 2024-07-15

## 2024-07-15 RX ORDER — ASPIRIN 325 MG/1
1 TABLET, FILM COATED ORAL
Qty: 0 | Refills: 0 | DISCHARGE
Start: 2024-07-15

## 2024-07-15 RX ORDER — CLOPIDOGREL BISULFATE 75 MG/1
1 TABLET, FILM COATED ORAL
Qty: 0 | Refills: 0 | DISCHARGE
Start: 2024-07-15

## 2024-07-15 RX ADMIN — CLONAZEPAM 0.25 MILLIGRAM(S): 2 TABLET ORAL at 21:23

## 2024-07-15 RX ADMIN — CARBIDOPA AND LEVODOPA 1 TABLET(S): 10; 100 TABLET ORAL at 11:00

## 2024-07-15 RX ADMIN — ASPIRIN 81 MILLIGRAM(S): 325 TABLET, FILM COATED ORAL at 11:00

## 2024-07-15 RX ADMIN — MEMANTINE HYDROCHLORIDE 10 MILLIGRAM(S): 7 CAPSULE, EXTENDED RELEASE ORAL at 11:00

## 2024-07-15 RX ADMIN — CARBIDOPA AND LEVODOPA 1 TABLET(S): 10; 100 TABLET ORAL at 08:02

## 2024-07-15 RX ADMIN — ATORVASTATIN CALCIUM 40 MILLIGRAM(S): 20 TABLET, FILM COATED ORAL at 21:22

## 2024-07-15 RX ADMIN — CLOPIDOGREL BISULFATE 75 MILLIGRAM(S): 75 TABLET, FILM COATED ORAL at 11:00

## 2024-07-15 RX ADMIN — CARBIDOPA AND LEVODOPA 1 TABLET(S): 10; 100 TABLET ORAL at 20:10

## 2024-07-15 RX ADMIN — CARBIDOPA AND LEVODOPA 1 TABLET(S): 10; 100 TABLET ORAL at 15:56

## 2024-07-15 NOTE — DISCHARGE NOTE PROVIDER - NSDCCPCAREPLAN_GEN_ALL_CORE_FT
PRINCIPAL DISCHARGE DIAGNOSIS  Diagnosis: Compression fracture of L1 vertebra  Assessment and Plan of Treatment: Pain control and home physical therapy.   Please follow up with your PCP, you may benefit from bisphosphonate therapy.     PRINCIPAL DISCHARGE DIAGNOSIS  Diagnosis: Compression fracture of L1 vertebra  Assessment and Plan of Treatment: Pain control and home physical therapy.   Please follow up with your PCP, you may benefit from bisphosphonate therapy.      SECONDARY DISCHARGE DIAGNOSES  Diagnosis: Thyroid enlargement  Assessment and Plan of Treatment: F/u PCP for thyroid ultrasound as needed.

## 2024-07-15 NOTE — DISCHARGE NOTE PROVIDER - HOSPITAL COURSE
This is a 72 yo F PMH Parkinson's disease, CVA, osteoporosis, HTN, HLD presenting after fall.    Fall   L1 compression fracture   CT chest, abdomen, pelvis revealing L1 compression fracture   XRays as above  Evaluated by ortho- no acute intervention  WBAT  Pain control   Unable to ambulate d/t pain -- PT consult-- recommended home pt     Parkinson's Disease   C/w home carbidopa/levadopa 25/100mg TID and Carbidopa/levadopa ER 25/100mg QHS     H/o CVA   C/w aspirin and statin     HTN  C/w amlodipine 2.5mg qd  DASH diet     HLD   c/w statin     Thyroid enlargement  Thyroid nodules   TSH level WNL  Recommend nonemergent thyroid function testing and ultrasound evaluation outpatient    Osteoporosis  Used to be on monthly therapy, no longer taking   Would likely benefit from bisphosphonate therapy given compression fracture   F/u pcp    DVT ppx - scds    Patient seen and evaluated. DC home with home PT.     DC time: 37 mins This is a 74 yo F PMH Parkinson's disease, CVA, osteoporosis, HTN, HLD presenting after fall. Noted with L1 compression fracture. Evaluated by ortho- no acute intervention, WBAT  Pain controlled, and eval by PT, plan for dispo to [place].   Also noted with thyroid enlargement, f/u PCP for thyroid ultrasound as needed.

## 2024-07-15 NOTE — DISCHARGE NOTE NURSING/CASE MANAGEMENT/SOCIAL WORK - PATIENT PORTAL LINK FT
You can access the FollowMyHealth Patient Portal offered by [facility] by registering at the following website: [email or web address]. By joining "[employer]"’s FollowMyHealth portal, you will also be able to view your health information using other applications (apps) compatible with our system.

## 2024-07-15 NOTE — DISCHARGE NOTE PROVIDER - CARE PROVIDER_API CALL
Your PCP,   Phone: (   )    -  Fax: (   )    -  Follow Up Time:     Your neurologist,   Phone: (   )    -  Fax: (   )    -  Follow Up Time:     Dejuan Alcaraz  Orthopaedic Surgery  [address]  [place] [address]  Phone: [phone]  Fax: [phone]  Follow Up Time: 1 week

## 2024-07-15 NOTE — DISCHARGE NOTE PROVIDER - NSDCMRMEDTOKEN_GEN_ALL_CORE_FT
amLODIPine 2.5 mg oral tablet: 1 tab(s) orally once a day  aspirin 81 mg oral tablet, chewable: 1 tab(s) orally once a day  atorvastatin 40 mg oral tablet: 1 tab(s) orally once a day (at bedtime)  clopidogrel 75 mg oral tablet: 1 tab(s) orally once a day  memantine 10 mg oral tablet: 1 tab(s) orally once a day  pimavanserin 34 mg oral capsule: 1 cap(s) orally once a day   amLODIPine 2.5 mg oral tablet: 1 tab(s) orally once a day  aspirin 81 mg oral tablet, chewable: 1 tab(s) orally once a day  atorvastatin 40 mg oral tablet: 1 tab(s) orally once a day (at bedtime)  carbidopa-levodopa 25 mg-100 mg oral tablet: 1 tab(s) orally 3 times a day  carbidopa-levodopa 25 mg-100 mg oral tablet, extended release: 1 tab(s) orally once a day (at bedtime)  clopidogrel 75 mg oral tablet: 1 tab(s) orally once a day  memantine 10 mg oral tablet: 1 tab(s) orally once a day  oxycodone-acetaminophen 5 mg-325 mg oral tablet: 1 tab(s) orally every 6 hours As needed Moderate Pain (4 - 6)  oxycodone-acetaminophen 5 mg-325 mg oral tablet: 2 tab(s) orally every 6 hours As needed Severe Pain (7 - 10)  pimavanserin 34 mg oral capsule: 1 cap(s) orally once a day

## 2024-07-15 NOTE — DISCHARGE NOTE PROVIDER - PROVIDER RX CONTACT NUMBER
[phone] Bilobed Flap Text: The defect edges were debeveled with a #15c scalpel blade.  Given the location of the defect and the proximity to free margins a bilobe flap was deemed most appropriate.  Using a sterile surgical marker, an appropriate bilobe flap drawn around the defect.    The area thus outlined was incised deep to adipose tissue with a #15 scalpel blade.  The skin margins were undermined to an appropriate distance in all directions utilizing iris scissors.

## 2024-07-15 NOTE — DISCHARGE NOTE PROVIDER - ATTENDING DISCHARGE PHYSICAL EXAMINATION:
VITALS:   T(C): 36.3 (07-17-24 @ 11:14), Max: 36.9 (07-16-24 @ 16:40)  HR: 62 (07-17-24 @ 11:14) (57 - 63)  BP: 116/75 (07-17-24 @ 11:14) (102/62 - 146/82)  RR: 18 (07-17-24 @ 11:14) (17 - 18)  SpO2: 96% (07-17-24 @ 11:14) (93% - 100%)    GENERAL: NAD, lying in bed comfortably  HEAD:  Atraumatic, Normocephalic  CHEST/LUNG: Clear to auscultation bilaterally; Unlabored respirations  HEART: Regular rate and rhythm; No murmurs, rubs, or gallops  ABDOMEN: BSx4; Soft, nontender, nondistended  SKIN: No rashes or lesions

## 2024-07-15 NOTE — CHART NOTE - NSCHARTNOTEFT_GEN_A_CORE
Pt needs rolling walker for MR ADLs in home due to fall and compression fracture of vertebrae. Pt needs walker to perform mR aDLs.
Chart reviewed and events to date noted. Upon request of  [facility]--discussed c patient and sister by bedside current progress with mobility, falls risk and near-baseline functioning. Patient s/p fall at home. Advised of what home PT will provide (home safety assessment, potential for temporary home attendant and visiting RN). Both patient and family still prefers to apply for Short Term Rehab, as sister stated she cannot 'lift' patient or provide physical support to her. They are aware of no assurance for Short Term Rehab approval.  advised of their preference. PT will continue to progress patient and recommend safe discharge plan.

## 2024-07-15 NOTE — DISCHARGE NOTE PROVIDER - PROVIDER TOKENS
FREE:[LAST:[Your PCP],PHONE:[(   )    -],FAX:[(   )    -]],FREE:[LAST:[Your neurologist],PHONE:[(   )    -],FAX:[(   )    -]],PROVIDER:[TOKEN:[[id number]],FOLLOWUP:[1 week]]

## 2024-07-15 NOTE — DISCHARGE NOTE NURSING/CASE MANAGEMENT/SOCIAL WORK - NSDCPEFALRISK_GEN_ALL_CORE
For information on Fall & Injury Prevention, visit: [email or web address] OR  [email or web address] OR  https://www.cdc.gov/steadi/patient.html

## 2024-07-16 PROCEDURE — 99232 SBSQ HOSP IP/OBS MODERATE 35: CPT

## 2024-07-16 RX ADMIN — MEMANTINE HYDROCHLORIDE 10 MILLIGRAM(S): 7 CAPSULE, EXTENDED RELEASE ORAL at 11:15

## 2024-07-16 RX ADMIN — CARBIDOPA AND LEVODOPA 1 TABLET(S): 10; 100 TABLET ORAL at 17:10

## 2024-07-16 RX ADMIN — CLOPIDOGREL BISULFATE 75 MILLIGRAM(S): 75 TABLET, FILM COATED ORAL at 11:15

## 2024-07-16 RX ADMIN — ATORVASTATIN CALCIUM 40 MILLIGRAM(S): 20 TABLET, FILM COATED ORAL at 21:17

## 2024-07-16 RX ADMIN — ASPIRIN 81 MILLIGRAM(S): 325 TABLET, FILM COATED ORAL at 11:15

## 2024-07-16 RX ADMIN — CARBIDOPA AND LEVODOPA 1 TABLET(S): 10; 100 TABLET ORAL at 20:23

## 2024-07-16 RX ADMIN — CLONAZEPAM 0.25 MILLIGRAM(S): 2 TABLET ORAL at 21:19

## 2024-07-16 RX ADMIN — CARBIDOPA AND LEVODOPA 1 TABLET(S): 10; 100 TABLET ORAL at 08:00

## 2024-07-16 RX ADMIN — CARBIDOPA AND LEVODOPA 1 TABLET(S): 10; 100 TABLET ORAL at 11:15

## 2024-07-16 NOTE — PROGRESS NOTE ADULT - ASSESSMENT
This is a 74 yo F PMH Parkinson's disease, CVA, osteoporosis, HTN, HLD presenting after fall.    Fall   L1 compression fracture   CT chest, abdomen, pelvis revealing L1 compression fracture   XRays as above  Evaluated by ortho- no acute intervention  WBAT  Pain control   Unable to ambulate d/t pain -- PT consult     Parkinson's Disease   C/w home carbidopa/levadopa 25/100mg TID and Carbidopa/levadopa ER 25/100mg QHS     H/o CVA   C/w aspirin and statin     HTN  C/w amlodipine 2.5mg qd  DASH diet     HLD   c/w statin     Thyroid enlargement  Thyroid nodules   TSH level WNL  Recommend nonemergent thyroid function testing and ultrasound evaluation outpatient    Osteoporosis  Used to be on monthly therapy, no longer taking   Would likely benefit from bisphosphonate therapy given compression fracture   F/u pcp    DVT ppx - scds  
This is a 72 yo F PMH Parkinson's disease, CVA, osteoporosis, HTN, HLD presenting after fall.    Fall   L1 compression fracture   CT chest, abdomen, pelvis revealing L1 compression fracture   XRays as above  Evaluated by ortho- no acute intervention  WBAT  Pain control   Unable to ambulate d/t pain -- PT consult     Parkinson's Disease   C/w home carbidopa/levadopa 25/100mg TID and Carbidopa/levadopa ER 25/100mg QHS     H/o CVA   C/w aspirin and statin     HTN  C/w amlodipine 2.5mg qd  DASH diet     HLD   c/w statin     Thyroid enlargement  Thyroid nodules   TSH level WNL  Recommend nonemergent thyroid function testing and ultrasound evaluation outpatient    Osteoporosis  Used to be on monthly therapy, no longer taking   Would likely benefit from bisphosphonate therapy given compression fracture   F/u pcp    DVT ppx - scds      Was DC home with home PT yesterday however sister unable to help patient ambulate at home , requesting [facility].

## 2024-07-16 NOTE — PROGRESS NOTE ADULT - SUBJECTIVE AND OBJECTIVE BOX
Patient is a 73y old  Female who presents with a chief complaint of Fall (15 Jul 2024 11:14)      INTERVAL HPI/OVERNIGHT EVENTS: no events. sitting in chair, pending jigar.     MEDICATIONS  (STANDING):  amLODIPine   Tablet 2.5 milliGRAM(s) Oral daily  aspirin  chewable 81 milliGRAM(s) Oral daily  atorvastatin 40 milliGRAM(s) Oral at bedtime  carbidopa/levodopa  25/100 1 Tablet(s) Oral <User Schedule>  carbidopa/levodopa CR 25/100 1 Tablet(s) Oral <User Schedule>  clonazePAM  Tablet 0.25 milliGRAM(s) Oral at bedtime  clopidogrel Tablet 75 milliGRAM(s) Oral daily  memantine 10 milliGRAM(s) Oral daily    MEDICATIONS  (PRN):  acetaminophen     Tablet .. 650 milliGRAM(s) Oral every 6 hours PRN Temp greater or equal to 38C (100.4F), Mild Pain (1 - 3)  aluminum hydroxide/magnesium hydroxide/simethicone Suspension 30 milliLiter(s) Oral every 4 hours PRN Dyspepsia  melatonin 3 milliGRAM(s) Oral at bedtime PRN Insomnia  ondansetron Injectable 4 milliGRAM(s) IV Push every 8 hours PRN Nausea and/or Vomiting  oxycodone    5 mG/acetaminophen 325 mG 2 Tablet(s) Oral every 6 hours PRN Severe Pain (7 - 10)  oxycodone    5 mG/acetaminophen 325 mG 1 Tablet(s) Oral every 6 hours PRN Moderate Pain (4 - 6)      Allergies    No Known Allergies    Intolerances        REVIEW OF SYSTEMS:  ROS negative unless stated otherwise.    Vital Signs Last 24 Hrs  T(C): 36.5 (16 Jul 2024 10:50), Max: 36.5 (16 Jul 2024 05:09)  T(F): 97.7 (16 Jul 2024 10:50), Max: 97.7 (16 Jul 2024 05:09)  HR: 63 (16 Jul 2024 10:50) (57 - 68)  BP: 136/85 (16 Jul 2024 10:50) (110/75 - 143/89)  BP(mean): --  RR: 16 (16 Jul 2024 10:50) (16 - 18)  SpO2: 95% (16 Jul 2024 10:50) (95% - 97%)    Parameters below as of 16 Jul 2024 10:50  Patient On (Oxygen Delivery Method): room air        PHYSICAL EXAM:  GENERAL: NAD  HEAD:  Atraumatic   EYES: EOMI, slight eyelid droop of L eye (from previous stroke)   ENMT: Moist mucous membranes  NECK: Supple   NERVOUS SYSTEM:  Awake  CHEST/LUNG: CTAB   HEART: RRR   ABDOMEN: Soft, Nontender, Nondistended  EXTREMITIES:   No clubbing, cyanosis, or edema  PSYCH: Mood appropriate  LABS:              CAPILLARY BLOOD GLUCOSE          RADIOLOGY & ADDITIONAL TESTS:    Imaging Personally Reviewed:  [ X] YES  [ ] NO    Consultant(s) Notes Reviewed:  [ X] YES  [ ] NO    Care Discussed with Consultants/Other Providers [X ] YES  [ ] NO
Patient is a 73y old  Female who presents with a chief complaint of Fall (13 Jul 2024 16:03)      INTERVAL HPI/OVERNIGHT EVENTS: Overnight no acute events. c/o pain on movement.     MEDICATIONS  (STANDING):  amLODIPine   Tablet 2.5 milliGRAM(s) Oral daily  aspirin  chewable 81 milliGRAM(s) Oral daily  atorvastatin 40 milliGRAM(s) Oral at bedtime  carbidopa/levodopa  25/100 1 Tablet(s) Oral <User Schedule>  carbidopa/levodopa CR 25/100 1 Tablet(s) Oral <User Schedule>  clonazePAM  Tablet 0.25 milliGRAM(s) Oral at bedtime  clopidogrel Tablet 75 milliGRAM(s) Oral daily  memantine 10 milliGRAM(s) Oral daily  pimavanserin Capsule 34 milliGRAM(s) Oral <User Schedule>    MEDICATIONS  (PRN):  acetaminophen     Tablet .. 650 milliGRAM(s) Oral every 6 hours PRN Temp greater or equal to 38C (100.4F), Mild Pain (1 - 3)  aluminum hydroxide/magnesium hydroxide/simethicone Suspension 30 milliLiter(s) Oral every 4 hours PRN Dyspepsia  melatonin 3 milliGRAM(s) Oral at bedtime PRN Insomnia  ondansetron Injectable 4 milliGRAM(s) IV Push every 8 hours PRN Nausea and/or Vomiting  oxycodone    5 mG/acetaminophen 325 mG 2 Tablet(s) Oral every 6 hours PRN Severe Pain (7 - 10)  oxycodone    5 mG/acetaminophen 325 mG 1 Tablet(s) Oral every 6 hours PRN Moderate Pain (4 - 6)      Allergies    No Known Allergies    Intolerances        REVIEW OF SYSTEMS:  ROS negative unless stated otherwise.    Vital Signs Last 24 Hrs  T(C): 36.6 (14 Jul 2024 11:05), Max: 36.6 (14 Jul 2024 11:05)  T(F): 97.9 (14 Jul 2024 11:05), Max: 97.9 (14 Jul 2024 11:05)  HR: 64 (14 Jul 2024 11:05) (61 - 67)  BP: 96/65 (14 Jul 2024 11:05) (96/65 - 154/73)  BP(mean): --  RR: 16 (14 Jul 2024 11:05) (14 - 18)  SpO2: 93% (14 Jul 2024 11:05) (93% - 98%)    Parameters below as of 14 Jul 2024 11:05  Patient On (Oxygen Delivery Method): room air        PHYSICAL EXAM:  GENERAL: NAD  HEAD:  Atraumatic   EYES: EOMI, slight eyelid droop of L eye (from previous stroke)   ENMT: Moist mucous membranes  NECK: Supple   NERVOUS SYSTEM:  Awake  CHEST/LUNG: CTAB   HEART: RRR   ABDOMEN: Soft, Nontender, Nondistended  EXTREMITIES:   No clubbing, cyanosis, or edema  PSYCH: Mood appropriate    LABS:                        13.7   4.30  )-----------( 203      ( 14 Jul 2024 06:05 )             41.3     07-14    139  |  105  |  18  ----------------------------<  86  3.5   |  26  |  0.68    Ca    9.3      14 Jul 2024 06:05    TPro  7.1  /  Alb  3.8  /  TBili  0.6  /  DBili  x   /  AST  26  /  ALT  12  /  AlkPhos  73  07-13    PT/INR - ( 13 Jul 2024 08:35 )   PT: 10.5 sec;   INR: 0.87 ratio         PTT - ( 13 Jul 2024 08:35 )  PTT:30.9 sec  Urinalysis Basic - ( 14 Jul 2024 06:05 )    Color: x / Appearance: x / SG: x / pH: x  Gluc: 86 mg/dL / Ketone: x  / Bili: x / Urobili: x   Blood: x / Protein: x / Nitrite: x   Leuk Esterase: x / RBC: x / WBC x   Sq Epi: x / Non Sq Epi: x / Bacteria: x      CAPILLARY BLOOD GLUCOSE          RADIOLOGY & ADDITIONAL TESTS:    Imaging Personally Reviewed:  [ X] YES  [ ] NO    Consultant(s) Notes Reviewed:  [ X] YES  [ ] NO    Care Discussed with Consultants/Other Providers [X ] YES  [ ] NO

## 2024-07-17 VITALS
SYSTOLIC BLOOD PRESSURE: 155 MMHG | RESPIRATION RATE: 18 BRPM | HEART RATE: 63 BPM | DIASTOLIC BLOOD PRESSURE: 86 MMHG | OXYGEN SATURATION: 100 % | TEMPERATURE: 97 F

## 2024-07-17 PROCEDURE — 99239 HOSP IP/OBS DSCHRG MGMT >30: CPT

## 2024-07-17 RX ORDER — CARBIDOPA AND LEVODOPA 10; 100 MG/1; MG/1
1 TABLET ORAL
Qty: 30 | Refills: 0
Start: 2024-07-17 | End: 2024-08-15

## 2024-07-17 RX ORDER — OXYCODONE AND ACETAMINOPHEN 5; 325 MG/1; MG/1
1 TABLET ORAL
Qty: 0 | Refills: 0 | DISCHARGE
Start: 2024-07-17

## 2024-07-17 RX ORDER — OXYCODONE AND ACETAMINOPHEN 5; 325 MG/1; MG/1
2 TABLET ORAL
Qty: 0 | Refills: 0 | DISCHARGE
Start: 2024-07-17

## 2024-07-17 RX ORDER — CLONAZEPAM 2 MG/1
0.25 TABLET ORAL
Qty: 0 | Refills: 0 | DISCHARGE
Start: 2024-07-17

## 2024-07-17 RX ORDER — CARBIDOPA AND LEVODOPA 10; 100 MG/1; MG/1
1 TABLET ORAL
Qty: 0 | Refills: 0 | DISCHARGE
Start: 2024-07-17

## 2024-07-17 RX ADMIN — CARBIDOPA AND LEVODOPA 1 TABLET(S): 10; 100 TABLET ORAL at 11:53

## 2024-07-17 RX ADMIN — ASPIRIN 81 MILLIGRAM(S): 325 TABLET, FILM COATED ORAL at 11:53

## 2024-07-17 RX ADMIN — CLOPIDOGREL BISULFATE 75 MILLIGRAM(S): 75 TABLET, FILM COATED ORAL at 11:52

## 2024-07-17 RX ADMIN — CARBIDOPA AND LEVODOPA 1 TABLET(S): 10; 100 TABLET ORAL at 16:02

## 2024-07-17 RX ADMIN — CARBIDOPA AND LEVODOPA 1 TABLET(S): 10; 100 TABLET ORAL at 08:36

## 2024-07-17 RX ADMIN — MEMANTINE HYDROCHLORIDE 10 MILLIGRAM(S): 7 CAPSULE, EXTENDED RELEASE ORAL at 11:53

## 2024-07-17 RX ADMIN — Medication 650 MILLIGRAM(S): at 13:06

## 2024-07-17 RX ADMIN — Medication 650 MILLIGRAM(S): at 13:58

## 2024-07-17 RX ADMIN — AMLODIPINE BESYLATE 2.5 MILLIGRAM(S): 2.5 TABLET ORAL at 06:00

## 2024-07-25 DIAGNOSIS — E78.5 HYPERLIPIDEMIA, UNSPECIFIED: ICD-10-CM
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
## 2024-07-25 DIAGNOSIS — F02.80 DEMENTIA IN OTHER DISEASES CLASSIFIED ELSEWHERE, UNSPECIFIED SEVERITY, WITHOUT BEHAVIORAL DISTURBANCE, PSYCHOTIC DISTURBANCE, MOOD DISTURBANCE, AND ANXIETY: ICD-10-CM

## 2024-07-25 DIAGNOSIS — Z86.73 PERSONAL HISTORY OF TRANSIENT ISCHEMIC ATTACK (TIA), AND CEREBRAL INFARCTION WITHOUT RESIDUAL DEFICITS: ICD-10-CM

## 2024-07-25 DIAGNOSIS — G20.A1 PARKINSON'S DISEASE WITHOUT DYSKINESIA, WITHOUT MENTION OF FLUCTUATIONS: ICD-10-CM
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
## 2024-07-25 DIAGNOSIS — S32.019A UNSPECIFIED FRACTURE OF FIRST LUMBAR VERTEBRA, INITIAL ENCOUNTER FOR CLOSED FRACTURE: ICD-10-CM

## 2024-07-25 DIAGNOSIS — E04.9 NONTOXIC GOITER, UNSPECIFIED: ICD-10-CM

## 2024-07-25 DIAGNOSIS — M81.0 AGE-RELATED OSTEOPOROSIS WITHOUT CURRENT PATHOLOGICAL FRACTURE: ICD-10-CM

## 2024-07-25 DIAGNOSIS — I10 ESSENTIAL (PRIMARY) HYPERTENSION: ICD-10-CM

## (undated) RX ORDER — FLUOROMETHOLONE 1 MG/ML: 1 SUSPENSION/ DROPS OPHTHALMIC

## (undated) RX ORDER — NEPAFENAC 3 MG/ML: 1 SUSPENSION/ DROPS OPHTHALMIC

## (undated) RX ORDER — BROMFENAC SODIUM 0.7 MG/ML: 1 SOLUTION/ DROPS OPHTHALMIC

## (undated) RX ORDER — DORZOLAMIDE 20 MG/ML: 1 SOLUTION/ DROPS OPHTHALMIC

## (undated) RX ORDER — FLUOROMETHOLONE 1 MG/ML: 1 SUSPENSION/ DROPS OPHTHALMIC ONCE A DAY